# Patient Record
Sex: MALE | Race: OTHER | URBAN - METROPOLITAN AREA
[De-identification: names, ages, dates, MRNs, and addresses within clinical notes are randomized per-mention and may not be internally consistent; named-entity substitution may affect disease eponyms.]

---

## 2022-01-07 ENCOUNTER — APPOINTMENT (OUTPATIENT)
Dept: CT IMAGING | Age: 37
DRG: 440 | End: 2022-01-07

## 2022-01-07 ENCOUNTER — HOSPITAL ENCOUNTER (INPATIENT)
Age: 37
LOS: 3 days | Discharge: HOME | DRG: 440 | End: 2022-01-10
Attending: EMERGENCY MEDICINE | Admitting: INTERNAL MEDICINE

## 2022-01-07 DIAGNOSIS — K85.90 ACUTE PANCREATITIS, UNSPECIFIED COMPLICATION STATUS, UNSPECIFIED PANCREATITIS TYPE: Primary | ICD-10-CM

## 2022-01-07 LAB
ALBUMIN SERPL-MCNC: 4.6 GM/DL (ref 3.4–5)
ALCOHOL SCREEN SERUM: <0.01 %WT/VOL
ALP BLD-CCNC: 106 IU/L (ref 40–129)
ALT SERPL-CCNC: 18 U/L (ref 10–40)
ANION GAP SERPL CALCULATED.3IONS-SCNC: 13 MMOL/L (ref 4–16)
AST SERPL-CCNC: 15 IU/L (ref 15–37)
BACTERIA: NEGATIVE /HPF
BASOPHILS ABSOLUTE: 0 K/CU MM
BASOPHILS RELATIVE PERCENT: 0.2 % (ref 0–1)
BILIRUB SERPL-MCNC: 0.3 MG/DL (ref 0–1)
BILIRUBIN URINE: NEGATIVE MG/DL
BLOOD, URINE: NEGATIVE
BUN BLDV-MCNC: 13 MG/DL (ref 6–23)
CALCIUM SERPL-MCNC: 9 MG/DL (ref 8.3–10.6)
CAST TYPE: ABNORMAL /HPF
CHLORIDE BLD-SCNC: 99 MMOL/L (ref 99–110)
CLARITY: CLEAR
CO2: 25 MMOL/L (ref 21–32)
COLOR: YELLOW
CREAT SERPL-MCNC: 0.9 MG/DL (ref 0.9–1.3)
CRYSTAL TYPE: NEGATIVE /HPF
DIFFERENTIAL TYPE: ABNORMAL
EKG ATRIAL RATE: 81 BPM
EKG DIAGNOSIS: NORMAL
EKG P AXIS: 62 DEGREES
EKG P-R INTERVAL: 142 MS
EKG Q-T INTERVAL: 368 MS
EKG QRS DURATION: 82 MS
EKG QTC CALCULATION (BAZETT): 427 MS
EKG R AXIS: 35 DEGREES
EKG T AXIS: 18 DEGREES
EKG VENTRICULAR RATE: 81 BPM
EOSINOPHILS ABSOLUTE: 0.1 K/CU MM
EOSINOPHILS RELATIVE PERCENT: 0.5 % (ref 0–3)
EPITHELIAL CELLS, UA: ABNORMAL /HPF
GFR AFRICAN AMERICAN: >60 ML/MIN/1.73M2
GFR NON-AFRICAN AMERICAN: >60 ML/MIN/1.73M2
GLUCOSE BLD-MCNC: 124 MG/DL (ref 70–99)
GLUCOSE, URINE: NEGATIVE MG/DL
HCT VFR BLD CALC: 41 % (ref 42–52)
HEMOGLOBIN: 13.6 GM/DL (ref 13.5–18)
IMMATURE NEUTROPHIL %: 0.2 % (ref 0–0.43)
KETONES, URINE: 15 MG/DL
LEUKOCYTE ESTERASE, URINE: NEGATIVE
LIPASE: 855 IU/L (ref 13–60)
LYMPHOCYTES ABSOLUTE: 1.2 K/CU MM
LYMPHOCYTES RELATIVE PERCENT: 12 % (ref 24–44)
MAGNESIUM: 2.1 MG/DL (ref 1.8–2.4)
MCH RBC QN AUTO: 27.2 PG (ref 27–31)
MCHC RBC AUTO-ENTMCNC: 33.2 % (ref 32–36)
MCV RBC AUTO: 82 FL (ref 78–100)
MONOCYTES ABSOLUTE: 0.7 K/CU MM
MONOCYTES RELATIVE PERCENT: 7.1 % (ref 0–4)
NITRITE URINE, QUANTITATIVE: NEGATIVE
PDW BLD-RTO: 12.1 % (ref 11.7–14.9)
PH, URINE: 7.5 (ref 5–8)
PLATELET # BLD: 274 K/CU MM (ref 140–440)
PMV BLD AUTO: 9 FL (ref 7.5–11.1)
POTASSIUM SERPL-SCNC: 3.2 MMOL/L (ref 3.5–5.1)
PROTEIN UA: NEGATIVE MG/DL
RBC # BLD: 5 M/CU MM (ref 4.6–6.2)
RBC URINE: ABNORMAL /HPF (ref 0–3)
SEGMENTED NEUTROPHILS ABSOLUTE COUNT: 7.7 K/CU MM
SEGMENTED NEUTROPHILS RELATIVE PERCENT: 80 % (ref 36–66)
SODIUM BLD-SCNC: 137 MMOL/L (ref 135–145)
SPECIFIC GRAVITY UA: 1.01 (ref 1–1.03)
TOTAL IMMATURE NEUTOROPHIL: 0.02 K/CU MM
TOTAL PROTEIN: 7.6 GM/DL (ref 6.4–8.2)
UROBILINOGEN, URINE: 0.2 MG/DL (ref 0.2–1)
WBC # BLD: 9.6 K/CU MM (ref 4–10.5)
WBC UA: <1 /HPF (ref 0–2)

## 2022-01-07 PROCEDURE — 96375 TX/PRO/DX INJ NEW DRUG ADDON: CPT

## 2022-01-07 PROCEDURE — 6360000004 HC RX CONTRAST MEDICATION: Performed by: EMERGENCY MEDICINE

## 2022-01-07 PROCEDURE — 96365 THER/PROPH/DIAG IV INF INIT: CPT

## 2022-01-07 PROCEDURE — 93010 ELECTROCARDIOGRAM REPORT: CPT | Performed by: INTERNAL MEDICINE

## 2022-01-07 PROCEDURE — 80053 COMPREHEN METABOLIC PANEL: CPT

## 2022-01-07 PROCEDURE — 83690 ASSAY OF LIPASE: CPT

## 2022-01-07 PROCEDURE — 84478 ASSAY OF TRIGLYCERIDES: CPT

## 2022-01-07 PROCEDURE — 99284 EMERGENCY DEPT VISIT MOD MDM: CPT

## 2022-01-07 PROCEDURE — 6360000002 HC RX W HCPCS: Performed by: EMERGENCY MEDICINE

## 2022-01-07 PROCEDURE — 93005 ELECTROCARDIOGRAM TRACING: CPT | Performed by: EMERGENCY MEDICINE

## 2022-01-07 PROCEDURE — 85025 COMPLETE CBC W/AUTO DIFF WBC: CPT

## 2022-01-07 PROCEDURE — 1200000000 HC SEMI PRIVATE

## 2022-01-07 PROCEDURE — 83735 ASSAY OF MAGNESIUM: CPT

## 2022-01-07 PROCEDURE — 2580000003 HC RX 258: Performed by: EMERGENCY MEDICINE

## 2022-01-07 PROCEDURE — 96366 THER/PROPH/DIAG IV INF ADDON: CPT

## 2022-01-07 PROCEDURE — 81001 URINALYSIS AUTO W/SCOPE: CPT

## 2022-01-07 PROCEDURE — G0480 DRUG TEST DEF 1-7 CLASSES: HCPCS

## 2022-01-07 PROCEDURE — 74177 CT ABD & PELVIS W/CONTRAST: CPT

## 2022-01-07 RX ORDER — ONDANSETRON 2 MG/ML
4 INJECTION INTRAMUSCULAR; INTRAVENOUS EVERY 6 HOURS PRN
Status: DISCONTINUED | OUTPATIENT
Start: 2022-01-07 | End: 2022-01-08 | Stop reason: SDUPTHER

## 2022-01-07 RX ORDER — 0.9 % SODIUM CHLORIDE 0.9 %
1000 INTRAVENOUS SOLUTION INTRAVENOUS ONCE
Status: COMPLETED | OUTPATIENT
Start: 2022-01-07 | End: 2022-01-07

## 2022-01-07 RX ORDER — MORPHINE SULFATE 4 MG/ML
4 INJECTION, SOLUTION INTRAMUSCULAR; INTRAVENOUS ONCE
Status: COMPLETED | OUTPATIENT
Start: 2022-01-07 | End: 2022-01-07

## 2022-01-07 RX ORDER — SODIUM CHLORIDE 0.9 % (FLUSH) 0.9 %
10 SYRINGE (ML) INJECTION PRN
Status: DISCONTINUED | OUTPATIENT
Start: 2022-01-07 | End: 2022-01-10 | Stop reason: HOSPADM

## 2022-01-07 RX ORDER — POTASSIUM CHLORIDE 7.45 MG/ML
20 INJECTION INTRAVENOUS ONCE
Status: COMPLETED | OUTPATIENT
Start: 2022-01-07 | End: 2022-01-07

## 2022-01-07 RX ORDER — HYDROMORPHONE HCL 110MG/55ML
1 PATIENT CONTROLLED ANALGESIA SYRINGE INTRAVENOUS ONCE
Status: COMPLETED | OUTPATIENT
Start: 2022-01-07 | End: 2022-01-07

## 2022-01-07 RX ORDER — ONDANSETRON 2 MG/ML
8 INJECTION INTRAMUSCULAR; INTRAVENOUS ONCE
Status: COMPLETED | OUTPATIENT
Start: 2022-01-07 | End: 2022-01-07

## 2022-01-07 RX ADMIN — SODIUM CHLORIDE, PRESERVATIVE FREE 10 ML: 5 INJECTION INTRAVENOUS at 02:21

## 2022-01-07 RX ADMIN — HYDROMORPHONE HYDROCHLORIDE 1 MG: 2 INJECTION INTRAMUSCULAR; INTRAVENOUS; SUBCUTANEOUS at 03:52

## 2022-01-07 RX ADMIN — SODIUM CHLORIDE 1000 ML: 9 INJECTION, SOLUTION INTRAVENOUS at 01:25

## 2022-01-07 RX ADMIN — ONDANSETRON 8 MG: 2 INJECTION INTRAMUSCULAR; INTRAVENOUS at 23:15

## 2022-01-07 RX ADMIN — POTASSIUM CHLORIDE 20 MEQ: 7.46 INJECTION, SOLUTION INTRAVENOUS at 02:26

## 2022-01-07 RX ADMIN — MORPHINE SULFATE 4 MG: 4 INJECTION INTRAVENOUS at 01:26

## 2022-01-07 RX ADMIN — SODIUM CHLORIDE 1000 ML: 9 INJECTION, SOLUTION INTRAVENOUS at 02:27

## 2022-01-07 RX ADMIN — IOPAMIDOL 75 ML: 755 INJECTION, SOLUTION INTRAVENOUS at 02:20

## 2022-01-07 RX ADMIN — ONDANSETRON 4 MG: 2 INJECTION INTRAMUSCULAR; INTRAVENOUS at 01:26

## 2022-01-07 RX ADMIN — MORPHINE SULFATE 4 MG: 4 INJECTION INTRAVENOUS at 23:15

## 2022-01-07 ASSESSMENT — PAIN SCALES - GENERAL
PAINLEVEL_OUTOF10: 0
PAINLEVEL_OUTOF10: 6
PAINLEVEL_OUTOF10: 5
PAINLEVEL_OUTOF10: 6

## 2022-01-07 ASSESSMENT — PAIN DESCRIPTION - ORIENTATION: ORIENTATION: MID

## 2022-01-07 ASSESSMENT — PAIN DESCRIPTION - LOCATION: LOCATION: ABDOMEN

## 2022-01-07 ASSESSMENT — PAIN DESCRIPTION - FREQUENCY: FREQUENCY: CONTINUOUS

## 2022-01-07 NOTE — ED NOTES
Ice water given as requested pt reports feeling thirsty denies nausea at this time and rates pain 1/10      Vilma Callahan RN  01/07/22 2329

## 2022-01-07 NOTE — ED NOTES
Pt laying quietly in bed VSS pt denies any pain states he feels \"much better than yesterday\"  Call light in reach no needs at this time      Yrn Jett RN  01/07/22 1840

## 2022-01-07 NOTE — ED PROVIDER NOTES
Emergency Department Encounter  [address]    Patient: Gerry Parry  MRN: [id number]  : 1985  Date of Evaluation: 2022  ED Provider: Cameron Thornton MD    Chief Complaint       Chief Complaint   Patient presents with    Abdominal Pain    Emesis     NEHAL Parry is a 43 y.o. male who presents to the emergency department who presents to the emergency department with epigastric abdominal pain that began 2 days ago. The patient states it started off as mild and is not getting worse since that time. He has had 2 previous episodes of this at which time he was diagnosed with a mild case of pancreatitis. He has had nausea since the onset but just had vomiting when he presented to the emergency department. No diarrhea or constipation. No fevers or chills. No cough. ROS:     At least 10 systems reviewed and otherwise acutely negative except as in the [address] Past History     Past Medical History:   Diagnosis Date    Pancreatitis      History reviewed. No pertinent surgical history. Social History     Socioeconomic History    Marital status: Single     Spouse name: None    Number of children: None    Years of education: None    Highest education level: None   Occupational History    None   Tobacco Use    Smoking status: Never Smoker    Smokeless tobacco: Never Used   Vaping Use    Vaping Use: Never used   Substance and Sexual Activity    Alcohol use: Never    Drug use: Never    Sexual activity: None   Other Topics Concern    None   Social History Narrative    None     Social Determinants of Health     Financial Resource Strain:     Difficulty of Paying Living Expenses: Not on file   Food Insecurity:     Worried About Running Out of Food in the Last Year: Not on file    Ashley of Food in the Last Year: Not on file   Transportation Needs:     Lack of Transportation (Medical): Not on file    Lack of Transportation (Non-Medical):  Not on file   Physical Activity:     Days of Exercise per Week: Not on file    Minutes of Exercise per Session: Not on file   Stress:     Feeling of Stress : Not on file   Social Connections:     Frequency of Communication with Friends and Family: Not on file    Frequency of Social Gatherings with Friends and Family: Not on file    Attends Rastafari Services: Not on file    Active Member of [address] [place] [address] [employer] or Organizations: Not on file    Attends Club or Organization Meetings: Not on file    Marital Status: Not on file   Intimate Partner Violence:     Fear of Current or Ex-Partner: Not on file    Emotionally Abused: Not on file    Physically Abused: Not on file    Sexually Abused: Not on file   Housing Stability:     Unable to Pay for Housing in the Last Year: Not on file    Number of [place] in the Last Year: Not on file    Unstable Housing in the Last Year: Not on file       Medications/Allergies     There are no discharge medications for this patient. No Known Allergies     Physical Exam       ED Triage Vitals [01/07/22 0034]   BP Temp Temp Source Pulse Resp SpO2 Height Weight   133/88 99.2 °F (37.3 °C) Oral 86 16 98 % 5' 5\" (1.651 m) 154 lb 5.2 oz (70 kg)     GENERAL APPEARANCE: Awake and alert. Cooperative. No acute distress. HEAD: Normocephalic. Atraumatic. EYES: Sclera anicteric. ENT: Tolerates saliva. No trismus. NECK: Supple. Trachea midline. CARDIO: RRR. Radial pulse 2+. LUNGS: Respirations unlabored. CTAB. ABDOMEN: Soft. Non-distended. Mild epigastric abdominal tenderness to palpation without guarding or rebound  EXTREMITIES: No acute deformities. SKIN: Warm and dry. NEUROLOGICAL: No gross facial drooping. Moves all 4 extremities spontaneously. PSYCHIATRIC: Normal mood.      Diagnostics   Labs:  Results for orders placed or performed during the hospital encounter of 01/07/22   CBC Auto Differential   Result Value Ref Range    WBC 9.6 4.0 - 10.5 K/CU MM    RBC 5.00 4.6 - 6.2 M/CU MM    Hemoglobin 13.6 13.5 - 18.0 GM/DL Hematocrit 41.0 (L) 42 - 52 %    MCV 82.0 78 - 100 FL    MCH 27.2 27 - 31 PG    MCHC 33.2 32.0 - 36.0 %    RDW 12.1 11.7 - 14.9 %    Platelets 806 426 - 318 K/CU MM    MPV 9.0 7.5 - 11.1 FL    Differential Type AUTOMATED DIFFERENTIAL     Segs Relative 80.0 (H) 36 - 66 %    Lymphocytes % 12.0 (L) 24 - 44 %    Monocytes % 7.1 (H) 0 - 4 %    Eosinophils % 0.5 0 - 3 %    Basophils % 0.2 0 - 1 %    Segs Absolute 7.7 K/CU MM    Lymphocytes Absolute 1.2 K/CU MM    Monocytes Absolute 0.7 K/CU MM    Eosinophils Absolute 0.1 K/CU MM    Basophils Absolute 0.0 K/CU MM    Immature Neutrophil % 0.2 0 - 0.43 %    Total Immature Neutrophil 0.02 K/CU MM   Comprehensive Metabolic Panel w/ Reflex to MG   Result Value Ref Range    Sodium 137 135 - 145 MMOL/L    Potassium 3.2 (L) 3.5 - 5.1 MMOL/L    Chloride 99 99 - 110 mMol/L    CO2 25 21 - 32 MMOL/L    BUN 13 6 - 23 MG/DL    CREATININE 0.9 0.9 - 1.3 MG/DL    Glucose 124 (H) 70 - 99 MG/DL    Calcium 9.0 8.3 - 10.6 MG/DL    Albumin 4.6 3.4 - 5.0 GM/DL    Total Protein 7.6 6.4 - 8.2 GM/DL    Total Bilirubin 0.3 0.0 - 1.0 MG/DL    ALT 18 10 - 40 U/L    AST 15 15 - 37 IU/L    Alkaline Phosphatase 106 40 - 129 IU/L    GFR Non-African American >60 >60 mL/min/1.73m2    GFR African American >60 >60 mL/min/1.73m2    Anion Gap 13 4 - 16   Lipase   Result Value Ref Range    Lipase 855 (H) 13 - 60 IU/L   Urinalysis Reflex to Culture    Specimen: Urine   Result Value Ref Range    Color, UA YELLOW YELLOW    Clarity, UA CLEAR CLEAR    Glucose, Urine NEGATIVE NEGATIVE MG/DL    Bilirubin Urine NEGATIVE NEGATIVE MG/DL    Ketones, Urine 15 (A) NEGATIVE MG/DL    Specific Gravity, UA 1.010 1.001 - 1.035    Blood, Urine NEGATIVE NEGATIVE    pH, Urine 7.5 5.0 - 8.0    Protein, UA NEGATIVE NEGATIVE MG/DL    Urobilinogen, Urine 0.2 0.2 - 1.0 MG/DL    Nitrite Urine, Quantitative NEGATIVE NEGATIVE    Leukocyte Esterase, Urine NEGATIVE NEGATIVE    RBC, UA NO CELLS SEEN 0 - 3 /HPF    WBC, UA <1 0 - 2 /HPF Epithelial Cells, UA NO CELLS SEEN /HPF    Cast Type NO CAST FORMS SEEN NO CAST FORMS SEEN /HPF    Bacteria, UA NEGATIVE NEGATIVE /HPF    Crystal Type NEGATIVE NEGATIVE /HPF   ETOH Blood   Result Value Ref Range    Alcohol Scrn <0.01 <0.01 %WT/VOL   Magnesium   Result Value Ref Range    Magnesium 2.1 1.8 - 2.4 mg/dl   EKG 12 Lead   Result Value Ref Range    Ventricular Rate 81 BPM    Atrial Rate 81 BPM    P-R Interval 142 ms    QRS Duration 82 ms    Q-T Interval 368 ms    QTc Calculation (Bazett) 427 ms    P Axis 62 degrees    R Axis 35 degrees    T Axis 18 degrees    Diagnosis       Normal sinus rhythm  Normal ECG  No previous ECGs available  Confirmed by JODIE Urena ([id number]) on 1/7/2022 5:39:34 PM       Radiographs:  No results found. Procedures/EKG:   Normal Sinus Rhythm 81  Axis is   Normal  QTc is  normal  There is no specific T wave changes appreciated. There is no specific ST wave changes appreciated. Prior EKG to compare with was not available as the patient has no previous ECGs in our system    ED Course and MDM   In brief, Guy Anaya is a 43 y.o. male who presented to the emergency department for epigastric abdominal pain that was found to have acute uncomplicated pancreatitis of idiopathic nature. Patient's labs demonstrate elevated lipase but a negative alcohol and unremarkable triglycerides. Patient is also not taking any offending medications or herbal supplements at this time. Spoke with the hospitalist on-call who accepted the patient for transfer to [facility] for further evaluation and treatment of his pancreatitis.     ED Medication Orders (From admission, onward)    Start Ordered     Status Ordering Provider    01/08/22 1330 01/08/22 0106  lactated ringers infusion  CONTINUOUS        \"Followed by\" Linked Group Details    Acknowledged RHETT JUDGE V    01/08/22 0900 01/08/22 0106  sodium chloride flush 0.9 % injection 5-40 mL  2 times per day         Acknowledged Duncan JUDGE V    01/08/22 0900 01/08/22 0106  enoxaparin (LOVENOX) injection 40 mg  DAILY         Acknowledged JUDGE, RHETT V    01/08/22 0130 01/08/22 0106  lactated ringers infusion  CONTINUOUS        \"Followed by\" Linked Group Details    Last MAR action: New Bag - by Jorge Melgoza on 01/08/22 at [address] [place] [address] 24 V    01/08/22 0106 01/08/22 0106  sodium chloride flush 0.9 % injection 5-40 mL  PRN         Acknowledged JUDGE, RHETT V    01/08/22 0106 01/08/22 0106  ondansetron (ZOFRAN-ODT) disintegrating tablet 4 mg  EVERY 8 HOURS PRN        \"Or\" Linked Group Details    Acknowledged JUDGE, RHETT V    01/08/22 0106 01/08/22 0106  ondansetron (ZOFRAN) injection 4 mg  EVERY 6 HOURS PRN        \"Or\" Linked Group Details    Acknowledged JUDGE, RHETT V    01/08/22 0106 01/08/22 0106  0.9 % sodium chloride infusion  PRN         Acknowledged JUDGE, RHETT V    01/08/22 0106 01/08/22 0106  acetaminophen (TYLENOL) tablet 650 mg  EVERY 4 HOURS PRN         Acknowledged JUDGE, RHETT V    01/07/22 2300 01/07/22 2259  morphine sulfate (PF) injection 4 mg  ONCE         Last MAR action: Given - by Nathalia Venegas on 01/07/22 at [place] [address]    01/07/22 2300 01/07/22 2259  ondansetron (ZOFRAN) injection 8 mg  ONCE         Last MAR action: Given - by Nathalia Venegas on 01/07/22 at [facility]    01/07/22 0400 01/07/22 0347  HYDROmorphone (DILAUDID) injection 1 mg  ONCE         Last MAR action: Given - by Bella Clayton on 01/07/22 at [address] [address]    01/07/22 0230 01/07/22 0215  potassium chloride 10 mEq/100 mL IVPB (Peripheral Line)  ONCE         Last MAR action: Stopped - by Nathalia Venegas on 01/07/22 at [address] [facility]    01/07/22 0230 01/07/22 0215  0.9 % sodium chloride bolus  ONCE         Last MAR action: Stopped - by FLORENTIN ANTUNEZ on 01/07/22 at [facility]    01/07/22 0220 01/07/22 0220  sodium chloride flush 0.9 % injection 10 mL  PRN         Last MAR action: Given - by Martha Birch on 01/07/22 at [address] [facility] 9 01/07/22 0220 01/07/22 0220  iopamidol (ISOVUE-370) 76 % injection 75 mL  IMG ONCE PRN         Last MAR action: Given - by Christiano Flores [facility]/07/22 [facility]    01/07/22 0130 01/07/22 0120  morphine sulfate (PF) injection 4 mg  ONCE         Last MAR action: Given - by Georgi [place] on 01/07/22 at [facility]    01/07/22 0100 01/07/22 0057  0.9 % sodium chloride bolus  ONCE         Last MAR action: Stopped [facility] [place] [facility]          Final Impression      1.  Acute pancreatitis, unspecified complication status, unspecified pancreatitis type      DISPOSITION Admitted 01/07/2022 04:09:17 AM     (Please note that portions of this note may have been completed with a voice recognition program. Efforts were made to edit the dictations but occasionally words are mis-transcribed.)    MD Paloma Hobbs [id number], MD  01/08/22 9771

## 2022-01-08 ENCOUNTER — APPOINTMENT (OUTPATIENT)
Dept: ULTRASOUND IMAGING | Age: 37
DRG: 440 | End: 2022-01-08

## 2022-01-08 ENCOUNTER — APPOINTMENT (OUTPATIENT)
Dept: MRI IMAGING | Age: 37
DRG: 440 | End: 2022-01-08

## 2022-01-08 LAB — TRIGL SERPL-MCNC: 120 MG/DL

## 2022-01-08 PROCEDURE — 94761 N-INVAS EAR/PLS OXIMETRY MLT: CPT

## 2022-01-08 PROCEDURE — A9579 GAD-BASE MR CONTRAST NOS,1ML: HCPCS | Performed by: SPECIALIST

## 2022-01-08 PROCEDURE — 76705 ECHO EXAM OF ABDOMEN: CPT

## 2022-01-08 PROCEDURE — 1200000000 HC SEMI PRIVATE

## 2022-01-08 PROCEDURE — 2580000003 HC RX 258: Performed by: INTERNAL MEDICINE

## 2022-01-08 PROCEDURE — 36415 COLL VENOUS BLD VENIPUNCTURE: CPT

## 2022-01-08 PROCEDURE — 6360000004 HC RX CONTRAST MEDICATION: Performed by: SPECIALIST

## 2022-01-08 PROCEDURE — 74181 MRI ABDOMEN W/O CONTRAST: CPT

## 2022-01-08 PROCEDURE — 84478 ASSAY OF TRIGLYCERIDES: CPT

## 2022-01-08 PROCEDURE — 6360000002 HC RX W HCPCS: Performed by: INTERNAL MEDICINE

## 2022-01-08 RX ORDER — SODIUM CHLORIDE 0.9 % (FLUSH) 0.9 %
5-40 SYRINGE (ML) INJECTION EVERY 12 HOURS SCHEDULED
Status: DISCONTINUED | OUTPATIENT
Start: 2022-01-08 | End: 2022-01-10 | Stop reason: HOSPADM

## 2022-01-08 RX ORDER — ONDANSETRON 4 MG/1
4 TABLET, ORALLY DISINTEGRATING ORAL EVERY 8 HOURS PRN
Status: DISCONTINUED | OUTPATIENT
Start: 2022-01-08 | End: 2022-01-10 | Stop reason: HOSPADM

## 2022-01-08 RX ORDER — ACETAMINOPHEN 325 MG/1
650 TABLET ORAL EVERY 4 HOURS PRN
Status: DISCONTINUED | OUTPATIENT
Start: 2022-01-08 | End: 2022-01-10 | Stop reason: HOSPADM

## 2022-01-08 RX ORDER — SODIUM CHLORIDE, SODIUM LACTATE, POTASSIUM CHLORIDE, CALCIUM CHLORIDE 600; 310; 30; 20 MG/100ML; MG/100ML; MG/100ML; MG/100ML
INJECTION, SOLUTION INTRAVENOUS CONTINUOUS
Status: DISCONTINUED | OUTPATIENT
Start: 2022-01-08 | End: 2022-01-10 | Stop reason: HOSPADM

## 2022-01-08 RX ORDER — SODIUM CHLORIDE 9 MG/ML
25 INJECTION, SOLUTION INTRAVENOUS PRN
Status: DISCONTINUED | OUTPATIENT
Start: 2022-01-08 | End: 2022-01-10 | Stop reason: HOSPADM

## 2022-01-08 RX ORDER — SODIUM CHLORIDE 0.9 % (FLUSH) 0.9 %
5-40 SYRINGE (ML) INJECTION PRN
Status: DISCONTINUED | OUTPATIENT
Start: 2022-01-08 | End: 2022-01-10 | Stop reason: HOSPADM

## 2022-01-08 RX ORDER — SODIUM CHLORIDE, SODIUM LACTATE, POTASSIUM CHLORIDE, CALCIUM CHLORIDE 600; 310; 30; 20 MG/100ML; MG/100ML; MG/100ML; MG/100ML
INJECTION, SOLUTION INTRAVENOUS CONTINUOUS
Status: DISPENSED | OUTPATIENT
Start: 2022-01-08 | End: 2022-01-08

## 2022-01-08 RX ORDER — MORPHINE SULFATE 2 MG/ML
2 INJECTION, SOLUTION INTRAMUSCULAR; INTRAVENOUS EVERY 4 HOURS PRN
Status: DISCONTINUED | OUTPATIENT
Start: 2022-01-08 | End: 2022-01-10 | Stop reason: HOSPADM

## 2022-01-08 RX ORDER — ONDANSETRON 2 MG/ML
4 INJECTION INTRAMUSCULAR; INTRAVENOUS EVERY 6 HOURS PRN
Status: DISCONTINUED | OUTPATIENT
Start: 2022-01-08 | End: 2022-01-10 | Stop reason: HOSPADM

## 2022-01-08 RX ADMIN — ENOXAPARIN SODIUM 40 MG: 100 INJECTION SUBCUTANEOUS at 08:09

## 2022-01-08 RX ADMIN — GADOTERIDOL 14 ML: 279.3 INJECTION, SOLUTION INTRAVENOUS at 09:59

## 2022-01-08 RX ADMIN — SODIUM CHLORIDE, POTASSIUM CHLORIDE, SODIUM LACTATE AND CALCIUM CHLORIDE: 600; 310; 30; 20 INJECTION, SOLUTION INTRAVENOUS at 01:18

## 2022-01-08 ASSESSMENT — PAIN SCALES - GENERAL: PAINLEVEL_OUTOF10: 0

## 2022-01-08 NOTE — H&P
HISTORY AND PHYSICAL  (Hospitalist, Internal Medicine)  IDENTIFYING INFORMATION   PATIENT:  Neo House  MRN:  [id number]  ADMIT DATE: 1/7/2022  TIME OF EVALUATION: 1/8/2022 1:05 AM    CHIEF COMPLAINT     Epigastric pain  HISTORY OF PRESENT ILLNESS   Neo House is a 43 y.o. male admitted for epigastric pain has been going for 3 days, states that he had nausea vomiting nonbilious nonbloody yesterday. Patient states that he is no longer having nausea or vomiting. Patient is currently thirsty, and is asking for liquids. Pt otherwise has no complaints of CP, SOB, dizziness, constipation or diarrhea, dysuria, joint pains, rash/boils, or fevers. Patient states that he has had pancreatitis in the past, ever since 2011, he was diagnosed in [facility], without any known etiology. He states that in between that time he had several episodes of pancreatitis-like symptoms, and has been treated empirically in [facility] several times in between this episode. Of note he does not drink, has not been any traumatic situations, does not smoke or take any medications. PMH listed below:    PAST MEDICAL, SURGICAL, FAMILY, and SOCIAL HISTORY     Past Medical History:   Diagnosis Date    Pancreatitis      History reviewed. No pertinent surgical history. History reviewed. No pertinent family history.   Family Hx of HTN  Family Hx as reviewed above, otherwise non-contributory  Social History     Socioeconomic History    Marital status: Single     Spouse name: None    Number of children: None    Years of education: None    Highest education level: None   Occupational History    None   Tobacco Use    Smoking status: Never Smoker    Smokeless tobacco: Never Used   Vaping Use    Vaping Use: Never used   Substance and Sexual Activity    Alcohol use: Never    Drug use: Never    Sexual activity: None   Other Topics Concern    None   Social History Narrative    None     Social Determinants of Health     Financial Resource Strain:     Difficulty of Paying Living Expenses: Not on file   Food Insecurity:     Worried About Running Out of Food in the Last Year: Not on file    Ran Out of Food in the Last Year: Not on file   Transportation Needs:     Lack of Transportation (Medical): Not on file    Lack of Transportation (Non-Medical): Not on file   Physical Activity:     Days of Exercise per Week: Not on file    Minutes of Exercise per Session: Not on file   Stress:     Feeling of Stress : Not on file   Social Connections:     Frequency of Communication with Friends and Family: Not on file    Frequency of Social Gatherings with Friends and Family: Not on file    Attends Sikh Services: Not on file    Active Member of [address] [place] [address] [employer] or Organizations: Not on file    Attends Club or Organization Meetings: Not on file    Marital Status: Not on file   Intimate Partner Violence:     Fear of Current or Ex-Partner: Not on file    Emotionally Abused: Not on file    Physically Abused: Not on file    Sexually Abused: Not on file   Housing Stability:     Unable to Pay for Housing in the Last Year: Not on file    Number of [place] in the Last Year: Not on file    Unstable Housing in the Last Year: Not on file       MEDICATIONS   Medications Prior to Admission  No medications prior to admission. Current Medications  Current Facility-Administered Medications   Medication Dose Route Frequency Provider Last Rate Last Admin    ondansetron (ZOFRAN) injection 4 mg  4 mg IntraVENous Q6H PRN Simone Perez MD   4 mg at 01/07/22 0126    sodium chloride flush 0.9 % injection 10 mL  10 mL IntraVENous PRN Simone Perez MD   10 mL at 01/07/22 0221         Allergies  No Known Allergies    REVIEW OF SYSTEMS   Within above limitations. 14 point review of systems reviewed. Pertinent positive or negative as per HPI or otherwise negative per 14 point systems review.       PHYSICAL EXAM     Wt Readings from Last 3 Encounters:   01/07/22 154 lb 5.2 oz (70 kg) Blood pressure 133/85, pulse 70, temperature 98.3 °F (36.8 °C), temperature source Oral, resp. rate 18, height 5' 5\" (1.651 m), weight 154 lb 5.2 oz (70 kg), SpO2 98 %. General - AAO x 3  Psych - Appropriate affect/speech. No agitation  Eyes - Eye lids intact. No scleral icterus  ENT - Lips wnl. External ear clear/dry/intact. No thyromegaly on inspection  Neuro - No gross peripheral or central neuro deficits on inspection  Heart - Sinus. RRR. S1 and S2 present. No added HS/murmurs appreciated. No elevated JVD appreciated  Lung - Adequate air entry b/l, No crackles/wheezes appreciated  GI - Soft. Epigastric tenderness palpation. No hepatosplenomegaly/ascites. BS+   - No CVA/suprapubic tenderness or palpable bladder distension  Skin - Intact. No rash/petechiae/ecchymosis. Warm extremities  MSK - Joints with normal ROM.  No joint swellings    Lines/Drains/Airways/Wounds:  [unfilled]    LABS AND IMAGING   CBC  [unfilled]    Last 3 Hemoglobin  Lab Results   Component Value Date    HGB 13.6 01/07/2022     Last 3 WBC/ANC  Lab Results   Component Value Date    WBC 9.6 01/07/2022     No components found for: GRNLOCTYABS  Last 3 Platelets  No results found for: PLATELET  Chemistry  [unfilled]  [unfilled]  No results found for: LDH  Coagulation Studies  No results found for: PTT, INR  Liver Function Studies  Lab Results   Component Value Date    ALT 18 01/07/2022    AST 15 01/07/2022    ALKPHOS 106 01/07/2022       Recent Imaging        Relevant labs and imaging reviewed    ASSESSMENT AND PLAN   David Bennett is a 43 y.o. male p/w    Acute Pancreatitis possibly idiopathic, needs triglycerides checked, added on  - elevated lipase  - TG  - daily lipase  - IV hydration  - GI consult            Case d/w ED provider    DVT ppx: Lovenox  Code status: [facility], Internal Medicine  1/8/2022 at 1:05 AM

## 2022-01-08 NOTE — ED NOTES
Report given to receiving Nurse Vanessa Brenner Nurse updated on Patient all questions and concerns addressed. Patient MD and Nurse signed EMTALA paperwork.  Patient understands he is being transferred to [facility] for further evaluation and treatment       Abhijeet Morales RN  01/07/22 7534

## 2022-01-08 NOTE — ED NOTES
Report given to Transport team. Team updated on plan of care.       Stephanie Urbano RN  01/07/22 8637

## 2022-01-08 NOTE — ED NOTES
Patient states he is nauseated and is having upper ABD pain 3 to 4 out of 10. Md made aware, see DAKOTA Arnold RN  01/07/22 8434

## 2022-01-08 NOTE — CONSULTS
[address], [address] [facility]                                  CONSULTATION    PATIENT NAME: Broderick Abreu                         :        1985  MED REC NO:   [id number]                          ROOM:       [id number]  ACCOUNT NO:   [de-identified]                           ADMIT DATE: 2022  PROVIDER:     Ghassan Mandel MD    CONSULT DATE:  2022    CHIEF COMPLAINT:  Upper abdominal pain with elevated serum lipase level  and abnormal CT scan, rule out acute pancreatitis, etiology to be  determined. HISTORY OF PRESENT ILLNESS:  As follows: The patient is a 41-year-old  [place] ([facility]) male with past medical history significant for hypertension and  an acute pancreatitis in  while residing in [facility], who  presented to the emergency room on 2022 with two-day history of  upper abdominal pain along with nausea and vomiting. In the emergency  room, the patient had chem profile drawn which was unremarkable apart  from a serum lipase, which was elevated at 855. CBC was unremarkable. CT scan of the abdomen showed findings consistent with acute  pancreatitis. The patient was admitted for further workup and  management. According to the patient, he was admitted in [facility] in   while residing there with an episode of acute pancreatitis for about  four or five days, no definite etiology was found. The patient  subsequently went to [facility], where he has had \"minor episodes of upper  abdominal pain,\" which has been treated symptomatically, but the patient  has never been admitted for acute pancreatitis. This is the patient's  second admission for acute pancreatitis. There is no history of anorexia, weight loss, hematemesis, melena, or  hematochezia. The patient has never had an EGD or colonoscopy done. The patient is hemodynamically stable. Serum triglyceride levels have  been ordered.     REVIEW OF SYSTEMS:  CENTRAL NERVOUS SYSTEM EXAMINATION:  The patient denies headache or  focal sensorimotor symptoms. CARDIOVASCULAR SYSTEM:  No history of chest pain, shortness of breath,  or leg swelling. GENITOURINARY SYSTEM:  No history of dysuria, pyuria, or hematuria. MUSCULOSKELETAL SYSTEM:  No history of aches and pains in muscles and  joints. RESPIRATORY SYSTEM:  No history of cough, hemoptysis, fever, or chills. PAST MEDICAL HISTORY:  Significant for history of hypertension and acute  pancreatitis with admission x1 in [address] [place] [address] in 2010. FAMILY HISTORY:  Noncontributory. There is no family history of  gastrointestinal type malignancy or acute pancreatitis. MEDICATIONS:  Please refer to the chart. SOCIOECONOMIC HISTORY:  No history of EtOH abuse. The patient does not  drink alcohol or use recreational drugs. SURGERIES:  None. ALLERGIES:  No known drug allergies. PHYSICAL EXAMINATION:  GENERAL:  A 68-year-old [place] ([facility]) male who is lying comfortably flat in  bed, in no acute distress but complaining of epigastric discomfort. He  is awake, alert, and oriented and pleasant to talk with. VITAL SIGNS:  Stable. HEENT:  Examination shows skull to be atraumatic. NECK:  Supple. CHEST:  Clear. HEART:  S1, S2 is normal.  ABDOMEN:  Soft and nondistended with mild tenderness in the epigastrium. No guarding or rigidity. Liver and spleen are not palpable. Bowel  sounds are present. RECTAL:  Exam is deferred. CNS:  Exam shows the patient to be awake, alert, and oriented. There is  no focal sensorimotor sign. MUSCULOSKELETAL SYSTEM:  Exam is unremarkable. LABORATORY DATA:  As above mentioned.     IMPRESSION:  A 68-year-old [place] ([facility]) male in apparently good health with  one prior episode of acute pancreatitis in 2010 while residing in  [address] [place] [address] presents with two-day history of upper abdominal pain  along with nausea, vomiting, elevated serum lipase level, and abnormal  CT scan, rule out acute recurrent pancreatitis, etiology to be  determined. RECOMMENDATIONS:  1. Agree with present management with IV fluids along with parenteral  analgesics and antiemetics. 2.  We will start the patient on clear liquid diet, advance as  tolerated. 3.  Check the patient's CBC, chem profile, amylase, and lipase in a.m.  4.  Monitor the patient for any complications of acute pancreatitis. 5.  We will also get an ultrasound of the right upper quadrant to rule  out gallstones/sludge in the gallbladder. 6.  We will also get an MRCP to evaluate the pancreatic duct in view of  abnormal CT scan. 7.  Since this is the patient's second episode of pancreatitis, the  patient will need an EUS/ERCP later as an outpatient for further workup  of his etiology for acute pancreatitis. 8.  The case and plan have been discussed in detail with the patient and  all his questions have been answered.         Breana Riddle MD    D: 01/08/2022 8:35:46       T: 01/08/2022 8:39:05     [email or web address]  Job#: [id number]     Doc#: [id number]    CC:

## 2022-01-08 NOTE — PROGRESS NOTES
Hospitalist Progress Note      Name:  Lv Salinas /Age/Sex: 1985  (43 y.o. male)   MRN & CSN:  [id number] & [id number] Admission Date/Time: 2022 12:56 AM   Location:  [address] [place] [address] PCP: No primary care provider on file. Hospital Day: 2    Assessment and Plan:   Lv Salinas is a 43 y.o.  male  who presents with <principal problem not specified>    1. Acute pancreatitis: Second episode appreciate GI input, continue pain control, monitor labs, MRCP showed acute pancreatitis with some improvement, ultrasound negative diet been advanced per GI will need an EUS as an outpatient  2. Disposition: Continue IV fluids continue advance diet as tolerated    Diet ADULT DIET; Clear Liquid   DVT Prophylaxis [] Lovenox, []  Heparin, [] SCDs, [] Ambulation   GI Prophylaxis [] PPI,  [] H2 Blocker,  [] Carafate,  [] Diet/Tube Feeds   Code Status Full Code     History of Present Illness:     Chief Complaint: <principal problem not specified>  Lv Salinas is a 43 y.o.  male  who presents with abdominal pain and diagnosed with pancreatitis    Ten point ROS reviewed negative, unless as noted above    Objective:   No intake or output data in the 24 hours ending 22 1748   Vitals:   Vitals:    22 1452   BP: 119/84   Pulse: 66   Resp: 17   Temp: 98.6 °F (37 °C)   SpO2: 97%     Physical Exam:   GEN Awake male, sitting upright in bed in no apparent distress. Appears given age. RESP Clear to auscultation, no wheezes, rales or rhonchi. Symmetric chest movement while on room air. CARDIO/VASC S1/S2 auscultated. Regular rate without appreciable murmurs, rubs, or gallops. No JVD or carotid bruits. Peripheral pulses equal bilaterally and palpable. No peripheral edema. GI Abdomen is soft without significant tenderness, masses, or guarding. Bowel sounds are normoactive. Rectal exam deferred.  No costovertebral angle tenderness. Normal appearing external genitalia. Haque catheter is not present.   HEME/LYMPH No palpable cervical lymphadenopathy and no hepatosplenomegaly. No petechiae or ecchymoses. MSK No gross joint deformities. SKIN Normal coloration, warm, dry. [facility] Awake, alert, oriented x 4. Affect appropriate. Medications:   Medications:    sodium chloride flush  5-40 mL IntraVENous 2 times per day    enoxaparin  40 mg SubCUTAneous Daily      Infusions:    lactated ringers 150 mL/hr at 01/08/22 1421    sodium chloride       PRN Meds: sodium chloride flush, 5-40 mL, PRN  sodium chloride, 25 mL, PRN  acetaminophen, 650 mg, Q4H PRN  ondansetron, 4 mg, Q8H PRN   Or  ondansetron, 4 mg, Q6H PRN  morphine, 2 mg, Q4H PRN  sodium chloride flush, 10 mL, PRN          Patient is still admitted because pain. The anticipated discharge is in greater than 24 hours.      Electronically signed by Rebeca Moody MD on 1/8/2022 at 5:48 PM

## 2022-01-09 LAB
ALBUMIN SERPL-MCNC: 4.5 GM/DL (ref 3.4–5)
ALP BLD-CCNC: 100 IU/L (ref 40–128)
ALT SERPL-CCNC: 17 U/L (ref 10–40)
AMYLASE: 69 U/L (ref 25–115)
ANION GAP SERPL CALCULATED.3IONS-SCNC: 11 MMOL/L (ref 4–16)
AST SERPL-CCNC: 17 IU/L (ref 15–37)
BASOPHILS ABSOLUTE: 0 K/CU MM
BASOPHILS RELATIVE PERCENT: 0.4 % (ref 0–1)
BILIRUB SERPL-MCNC: 0.4 MG/DL (ref 0–1)
BUN BLDV-MCNC: 9 MG/DL (ref 6–23)
CALCIUM SERPL-MCNC: 9.1 MG/DL (ref 8.3–10.6)
CHLORIDE BLD-SCNC: 99 MMOL/L (ref 99–110)
CO2: 28 MMOL/L (ref 21–32)
CREAT SERPL-MCNC: 0.9 MG/DL (ref 0.9–1.3)
DIFFERENTIAL TYPE: ABNORMAL
EOSINOPHILS ABSOLUTE: 0.1 K/CU MM
EOSINOPHILS RELATIVE PERCENT: 1.8 % (ref 0–3)
GFR AFRICAN AMERICAN: >60 ML/MIN/1.73M2
GFR NON-AFRICAN AMERICAN: >60 ML/MIN/1.73M2
GLUCOSE BLD-MCNC: 92 MG/DL (ref 70–99)
HCT VFR BLD CALC: 42.2 % (ref 42–52)
HEMOGLOBIN: 13.6 GM/DL (ref 13.5–18)
IMMATURE NEUTROPHIL %: 0.2 % (ref 0–0.43)
LIPASE: 53 IU/L (ref 13–60)
LYMPHOCYTES ABSOLUTE: 1.2 K/CU MM
LYMPHOCYTES RELATIVE PERCENT: 24.9 % (ref 24–44)
MCH RBC QN AUTO: 27.5 PG (ref 27–31)
MCHC RBC AUTO-ENTMCNC: 32.2 % (ref 32–36)
MCV RBC AUTO: 85.3 FL (ref 78–100)
MONOCYTES ABSOLUTE: 0.4 K/CU MM
MONOCYTES RELATIVE PERCENT: 7.1 % (ref 0–4)
NUCLEATED RBC %: 0 %
PDW BLD-RTO: 11.9 % (ref 11.7–14.9)
PLATELET # BLD: 301 K/CU MM (ref 140–440)
PMV BLD AUTO: 9.4 FL (ref 7.5–11.1)
POTASSIUM SERPL-SCNC: 3.8 MMOL/L (ref 3.5–5.1)
RBC # BLD: 4.95 M/CU MM (ref 4.6–6.2)
SEGMENTED NEUTROPHILS ABSOLUTE COUNT: 3.2 K/CU MM
SEGMENTED NEUTROPHILS RELATIVE PERCENT: 65.6 % (ref 36–66)
SODIUM BLD-SCNC: 138 MMOL/L (ref 135–145)
TOTAL IMMATURE NEUTOROPHIL: 0.01 K/CU MM
TOTAL NUCLEATED RBC: 0 K/CU MM
TOTAL PROTEIN: 7 GM/DL (ref 6.4–8.2)
WBC # BLD: 4.9 K/CU MM (ref 4–10.5)

## 2022-01-09 PROCEDURE — 1200000000 HC SEMI PRIVATE

## 2022-01-09 PROCEDURE — 82150 ASSAY OF AMYLASE: CPT

## 2022-01-09 PROCEDURE — 6360000002 HC RX W HCPCS: Performed by: INTERNAL MEDICINE

## 2022-01-09 PROCEDURE — 85025 COMPLETE CBC W/AUTO DIFF WBC: CPT

## 2022-01-09 PROCEDURE — 80053 COMPREHEN METABOLIC PANEL: CPT

## 2022-01-09 PROCEDURE — 83690 ASSAY OF LIPASE: CPT

## 2022-01-09 PROCEDURE — 36415 COLL VENOUS BLD VENIPUNCTURE: CPT

## 2022-01-09 PROCEDURE — 94761 N-INVAS EAR/PLS OXIMETRY MLT: CPT

## 2022-01-09 RX ADMIN — ENOXAPARIN SODIUM 40 MG: 100 INJECTION SUBCUTANEOUS at 07:53

## 2022-01-09 ASSESSMENT — PAIN SCALES - GENERAL: PAINLEVEL_OUTOF10: 0

## 2022-01-09 NOTE — PROGRESS NOTES
Hospitalist Progress Note      Name:  Irma Tony /Age/Sex: 1985  (43 y.o. male)   MRN & CSN:  [id number] & [id number] Admission Date/Time: 2022 12:56 AM   Location:  [address] [place] [address] PCP: No primary care provider on file. Hospital Day: 3    Assessment and Plan:   Irma Tony is a 43 y.o.  male  who presents with <principal problem not specified>    1. Pancreatitis: appreciate GI input ultrasound negative EUS as outpatient continue IV fluids   2. Dispo: discharge in am if stable    Diet ADULT DIET; Regular; Low Fat/Low Chol/High Fiber/ELI   DVT Prophylaxis [] Lovenox, []  Heparin, [] SCDs, [] Ambulation   GI Prophylaxis [] PPI,  [] H2 Blocker,  [] Carafate,  [] Diet/Tube Feeds   Code Status Full Code         History of Present Illness:     Chief Complaint: <principal problem not specified>  Irma Tony is a 43 y.o.  male  who presents with abdominal pain and pancreatitis        Ten point ROS reviewed negative, unless as noted above    Objective:   No intake or output data in the 24 hours ending 22 1607   Vitals:   Vitals:    22 1519   BP: 129/87   Pulse: 66   Resp: 18   Temp: 99.1 °F (37.3 °C)   SpO2: 100%     Physical Exam:   GEN Awake male, sitting upright in bed in no apparent distress. Appears given age. EYES Pupils are equally round. No scleral erythema, discharge, or conjunctivitis. HENT Mucous membranes are moist. Oral pharynx without exudates, no evidence of thrush. NECK Supple, no apparent thyromegaly or masses. RESP Clear to auscultation, no wheezes, rales or rhonchi. Symmetric chest movement while on room air. CARDIO/VASC S1/S2 auscultated. Regular rate without appreciable murmurs, rubs, or gallops. No JVD or carotid bruits. Peripheral pulses equal bilaterally and palpable. No peripheral edema. GI Abdomen is soft without significant tenderness, masses, or guarding. Bowel sounds are normoactive. Rectal exam deferred. MSK No gross joint deformities.   SKIN Normal

## 2022-01-09 NOTE — PROGRESS NOTES
DOING BETTER MINIMAL EPIGASTRIC PAIN NO VOMITING TOLERATING CLEAR LIQUIDS  VITALS STABLE   LABS  FROM AM PENDING  TRIGLYCERIDES 120  U/S NEGATIVE FOR GS AND MRCP--AP  WILL ADVANCE DIET

## 2022-01-10 VITALS
OXYGEN SATURATION: 96 % | HEIGHT: 65 IN | WEIGHT: 159.6 LBS | DIASTOLIC BLOOD PRESSURE: 76 MMHG | RESPIRATION RATE: 18 BRPM | BODY MASS INDEX: 26.59 KG/M2 | SYSTOLIC BLOOD PRESSURE: 119 MMHG | TEMPERATURE: 97.2 F | HEART RATE: 76 BPM

## 2022-01-10 PROBLEM — K85.90 ACUTE PANCREATITIS: Status: RESOLVED | Noted: 2022-01-07 | Resolved: 2022-01-10

## 2022-01-10 PROBLEM — K85.90 ACUTE PANCREATITIS: Status: ACTIVE | Noted: 2022-01-10

## 2022-01-10 PROCEDURE — 83690 ASSAY OF LIPASE: CPT

## 2022-01-10 PROCEDURE — 80053 COMPREHEN METABOLIC PANEL: CPT

## 2022-01-10 PROCEDURE — 2580000003 HC RX 258: Performed by: INTERNAL MEDICINE

## 2022-01-10 RX ADMIN — SODIUM CHLORIDE, POTASSIUM CHLORIDE, SODIUM LACTATE AND CALCIUM CHLORIDE: 600; 310; 30; 20 INJECTION, SOLUTION INTRAVENOUS at 05:17

## 2022-01-10 NOTE — CARE COORDINATION
Reviewed chart and spoke with pt about discharge needs/plans. Pt lives with a friend in [place], [place] he was independent with ADL's and friend able to assist with transportation. Pt has no insurance or PCP. F/U info for clinics placed in pt's AVS, along with transportation info medicine assistance.    CM available if any needs arise